# Patient Record
Sex: FEMALE | Race: WHITE | Employment: FULL TIME | URBAN - NONMETROPOLITAN AREA
[De-identification: names, ages, dates, MRNs, and addresses within clinical notes are randomized per-mention and may not be internally consistent; named-entity substitution may affect disease eponyms.]

---

## 2017-06-16 ENCOUNTER — TELEPHONE (OUTPATIENT)
Age: 52
End: 2017-06-16

## 2017-08-19 ENCOUNTER — HOSPITAL ENCOUNTER (OUTPATIENT)
Age: 52
Discharge: HOME | End: 2017-08-19
Payer: COMMERCIAL

## 2017-08-19 PROCEDURE — 93005 ELECTROCARDIOGRAM TRACING: CPT

## 2017-08-20 LAB
EKG ATRIAL RATE: 92 BPM
EKG P AXIS: 61 DEGREES
EKG P-R INTERVAL: 176 MS
EKG Q-T INTERVAL: 370 MS
EKG QRS DURATION: 96 MS
EKG QTC CALCULATION (BAZETT): 457 MS
EKG R AXIS: 20 DEGREES
EKG T AXIS: 37 DEGREES
EKG VENTRICULAR RATE: 92 BPM

## 2017-08-25 ENCOUNTER — HOSPITAL ENCOUNTER (OUTPATIENT)
Dept: NON INVASIVE DIAGNOSTICS | Age: 52
Discharge: HOME | End: 2017-08-25
Payer: COMMERCIAL

## 2017-08-25 LAB
LV EF: 69 %
LVEF MODALITY: NORMAL

## 2017-08-25 PROCEDURE — A9500 TC99M SESTAMIBI: HCPCS | Performed by: NUCLEAR MEDICINE

## 2017-08-25 PROCEDURE — 78452 HT MUSCLE IMAGE SPECT MULT: CPT

## 2017-08-25 PROCEDURE — 3430000000 HC RX DIAGNOSTIC RADIOPHARMACEUTICAL: Performed by: NUCLEAR MEDICINE

## 2017-08-25 PROCEDURE — 93017 CV STRESS TEST TRACING ONLY: CPT

## 2017-08-25 RX ADMIN — Medication 9.9 MILLICURIE: at 12:50

## 2017-08-25 RX ADMIN — Medication 32.6 MILLICURIE: at 14:20

## 2017-10-05 ENCOUNTER — HOSPITAL ENCOUNTER (OUTPATIENT)
Dept: MRI IMAGING | Age: 52
Discharge: HOME | End: 2017-10-05
Payer: COMMERCIAL

## 2017-10-05 DIAGNOSIS — M25.561 ACUTE PAIN OF RIGHT KNEE: ICD-10-CM

## 2017-10-05 PROCEDURE — 73721 MRI JNT OF LWR EXTRE W/O DYE: CPT

## 2020-01-24 ENCOUNTER — TELEPHONE (OUTPATIENT)
Dept: PULMONOLOGY | Age: 55
End: 2020-01-24

## 2020-01-24 NOTE — TELEPHONE ENCOUNTER
A prescription for new CPAP device was placed in Epic. Please fax the order to her DME. Keep follow up appointment with my clinic as scheduled with latest down load.

## 2020-01-24 NOTE — TELEPHONE ENCOUNTER
Patient called and stated that the humidifier part of her machine is broken. She is still using the machine but it makes her so dry and causes coughing that she cannot sleep. She was requesting a prescription for a new machine. She stated that the type she is using has been discontinued. She was last seen on 1/8/16 so an appointment was scheduled for 3/13/20 and was put on the wait list. Please advise if any other instructions.

## 2020-01-28 NOTE — TELEPHONE ENCOUNTER
Phoned patient to notify order was sent to [facility]. Left message to call back our office.

## 2020-02-03 ENCOUNTER — TELEPHONE (OUTPATIENT)
Dept: PULMONOLOGY | Age: 55
End: 2020-02-03

## 2020-02-03 NOTE — TELEPHONE ENCOUNTER
Per Rain Pt needs a F2F visit within 30 days for new CPAP order to be valid. I called today, but could not leave a message. LM on 1/27 to call back and Nilson Dobson called her on the 28th.

## 2020-02-16 NOTE — PROGRESS NOTES
smoker: Yes. Amount of current tobacco smokin.0 PPD    Sleep apnea symptoms:  She was diagnosed with Severe obstructive sleep apnea on treatment with a CPAP pressure of 14cm H20. She had problems with her old machine. Her heated humidifier was broken on her old machine. It was replaced with a new one. She is currently using a nasal mask. She requested a new machine and issued. History of headaches in the morning:No.  History of dry mouth in the morning: Yes. History of palpitations during night time/nocturnal awakenings: Some times. History of sweating during night time/nocturnal awakenings: Yes    General:  History of head injury in the past: No.   History of seizures: No.   Rest less legs syndrome symptoms:NO  History suggestive of periodic limb movements during sleep: NO  History suggestive of hypnagogic hallucinations: NO  History suggestive of hypnopompic hallucinations: NO  History suggestive of sleep talking:NO  History suggestive of sleep walking:NO  History suggestive of bruxism: No.  History suggestive of cataplexy: NO  History suggestive of sleep paralysis: NO    Family history of sleep disorders:  Family history of obstructive sleep apnea: NO.  Family history of Narcolepsy: NO.  Family history of Rest less legs syndrome : NO.    History regarding old sleep studies:  Prior history of sleep study: Yes. Using CPAP device:Yes  Currently using home Oxygen: NO.      Patient considerations:  Is the patient is ambulatory: Yes  Patient is currently using: None of these Wheelchair, Susan Ion or [employer].   Para/Quadriplegic: NO  Hearing deficit : NO  Claustrophobic: NO  MDD : NO  Blind: NO  Incontinent: NO  Para/Quadraplegi: NO.   Need transportation to and from Sleep Center:NO      Social History:  Social History     Tobacco Use    Smoking status: Current Every Day Smoker     Packs/day: 1.00     Types: Cigarettes     Start date: 3/2/2001    Smokeless tobacco: Never Used   Substance Use Topics    Alcohol use: Yes     Alcohol/week: 0.0 standard drinks     Comment: very rare    Drug use: No   .  She is currently working: Yes. She is currently working for [employer]. She usually goes to her work at:  7:30AM.   She completes her work at:  2:00PM.                          Past Medical History:   Diagnosis Date    Tachycardia        Past Surgical History:   Procedure Laterality Date     SECTION         No Known Allergies    Current Outpatient Medications   Medication Sig Dispense Refill    Insulin NPH Human, Isophane, (HUMULIN N SC) Inject into the skin      buPROPion (WELLBUTRIN XL) 300 MG extended release tablet Take 300 mg by mouth every morning      glipiZIDE (GLUCOTROL) 5 MG tablet Take 5 mg by mouth 2 times daily (before meals)      atenolol (TENORMIN) 50 MG tablet Take 1.5 tablets by mouth daily 45 tablet 0    meloxicam (MOBIC) 15 MG TABS Take 15 mg by mouth Plan to take short term      Probiotic Product (PROBIOTIC DAILY PO) Take by mouth 10 strains, 30 billion AC      albuterol (PROVENTIL) (2.5 MG/3ML) 0.083% nebulizer solution Take 2.5 mg by nebulization every 6 hours as needed for Wheezing 8.5 gm. PRN      cetirizine (ZYRTEC) 10 MG tablet Take 10 mg by mouth daily.  sitaGLIPtin (JANUVIA) 100 MG tablet Take 100 mg by mouth daily.  metFORMIN (GLUCOPHAGE) 1000 MG tablet Take 1,000 mg by mouth 2 times daily (with meals).  atorvastatin (LIPITOR) 10 MG tablet Take 10 mg by mouth daily.  Loratadine-Pseudoephedrine (CLARITIN-D 12 HOUR PO) Take  by mouth.  DULoxetine (CYMBALTA) 60 MG capsule Take 60 mg by mouth daily.  budesonide-formoterol (SYMBICORT) 80-4.5 MCG/ACT AERO Inhale 2 puffs into the lungs 2 times daily. No current facility-administered medications for this visit. No family history on file. Review of Systems:   General/Constitutional: She gained 28lbs of weight from her old sleep study with normal appetite.  No fever or affect. Patient behavior is normal.     Diagnostic Data:  None related sleep. Sleep study: 07/21/2009                                CPAP study: 7/28/2009                Diagnostic Data: Psg 7/21/07  Ahi 131.6  PAP Download:   Recorded compliance dates:1/20/20-2/27/20  More than 4hour usage compliance was 66.7:%.(She start using her CPAP machine on 2/7/20. The download was calculated from 1/29/20). Average residual Apnea- Hypoapnea index on current pressue was:1.0.       PAP Type cpap   Level  14      Average usuage hours per day was:.[id number]         Interface: nasal     Provider:  []?[facility]             []?[facility]                        [x]? [facility]          []? [facility]                           []?[facility]      []? Other:         Assessment:  -She was diagnosed with Severe obstructive sleep apnea on treatment with a CPAP pressure of 14cm H20. She had problems with her old machine. Her machine is an old, obsolete one. It was initially issued in 2009. Her heated humidifier on old machine is broken. It was replaced with a new machine. She is currently using a nasal mask. She is using her machine with good compliance. How ever she start using her CPAP machine on 2/7/20. The download was calculated from 1/29/20.  -Bronchial asthma- on treatment with Adviar and Albuterol- She follows with her family physician. -Depression on meds.  -Allergic rhinitis on treatment with Claritin 10mg PO at bed time.  -Hypersomnia ( Excessive daytime sleepiness) may be due to poor compliance to recommended CPAP therapy for her obstructive sleep apnea Vs Inadequate sleep hygiene. Recommendations/Plan:  -She was advised to submit her down load report from [facility] for next 1month starting from today to document her > 4hour compliance. She was informed about the possibility of loosing her CPAP if she don't use it with good compliance for >4hours i.e >70%.  She verbalizes understanding.  -She was advised to continue current

## 2020-03-02 ENCOUNTER — INITIAL CONSULT (OUTPATIENT)
Dept: PULMONOLOGY | Age: 55
End: 2020-03-02
Payer: COMMERCIAL

## 2020-03-02 VITALS
SYSTOLIC BLOOD PRESSURE: 118 MMHG | HEART RATE: 86 BPM | DIASTOLIC BLOOD PRESSURE: 78 MMHG | WEIGHT: 273.6 LBS | OXYGEN SATURATION: 96 % | BODY MASS INDEX: 46.71 KG/M2 | HEIGHT: 64 IN

## 2020-03-02 PROCEDURE — 99204 OFFICE O/P NEW MOD 45 MIN: CPT | Performed by: INTERNAL MEDICINE

## 2020-03-02 RX ORDER — BUPROPION HYDROCHLORIDE 300 MG/1
300 TABLET ORAL EVERY MORNING
COMMUNITY

## 2020-03-02 NOTE — PATIENT INSTRUCTIONS
Patient Education        Stopping Smoking: Care Instructions  Your Care Instructions  Cigarette smokers crave the nicotine in cigarettes. Giving it up is much harder than simply changing a habit. Your body has to stop craving the nicotine. It is hard to quit, but you can do it. There are many tools that people use to quit smoking. You may find that combining tools works best for you. There are several steps to quitting. First you get ready to quit. Then you get support to help you. After that, you learn new skills and behaviors to become a nonsmoker. For many people, a necessary step is getting and using medicine. Your doctor will help you set up the plan that best meets your needs. You may want to attend a smoking cessation program to help you quit smoking. When you choose a program, look for one that has proven success. Ask your doctor for ideas. You will greatly increase your chances of success if you take medicine as well as get counseling or join a cessation program.  Some of the changes you feel when you first quit tobacco are uncomfortable. Your body will miss the nicotine at first, and you may feel short-tempered and grumpy. You may have trouble sleeping or concentrating. Medicine can help you deal with these symptoms. You may struggle with changing your smoking habits and rituals. The last step is the tricky one: Be prepared for the smoking urge to continue for a time. This is a lot to deal with, but keep at it. You will feel better. Follow-up care is a key part of your treatment and safety. Be sure to make and go to all appointments, and call your doctor if you are having problems. It's also a good idea to know your test results and keep a list of the medicines you take. How can you care for yourself at home? · Ask your family, friends, and coworkers for support. You have a better chance of quitting if you have help and support.   · Join a support group, such as Nicotine Anonymous, for people who are trying to quit smoking. · Consider signing up for a smoking cessation program, such as the American Lung Association's Freedom from Smoking program.  · Get text messaging support. Go to the website at www.smokefree. gov to sign up for the [facility] program.  · Set a quit date. Pick your date carefully so that it is not right in the middle of a big deadline or stressful time. Once you quit, do not even take a puff. Get rid of all ashtrays and lighters after your last cigarette. Clean your house and your clothes so that they do not smell of smoke. · Learn how to be a nonsmoker. Think about ways you can avoid those things that make you reach for a cigarette. ? Avoid situations that put you at greatest risk for smoking. For some people, it is hard to have a drink with friends without smoking. For others, they might skip a coffee break with coworkers who smoke. ? Change your daily routine. Take a different route to work or eat a meal in a different place. · Cut down on stress. Calm yourself or release tension by doing an activity you enjoy, such as reading a book, taking a hot bath, or gardening. · Talk to your doctor or pharmacist about nicotine replacement therapy, which replaces the nicotine in your body. You still get nicotine but you do not use tobacco. Nicotine replacement products help you slowly reduce the amount of nicotine you need. These products come in several forms, many of them available over-the-counter:  ? Nicotine patches  ? Nicotine gum and lozenges  ? Nicotine inhaler  · Ask your doctor about bupropion (Wellbutrin) or varenicline (Chantix), which are prescription medicines. They do not contain nicotine. They help you by reducing withdrawal symptoms, such as stress and anxiety. · Some people find hypnosis, acupuncture, and massage helpful for ending the smoking habit. · Eat a healthy diet and get regular exercise.  Having healthy habits will help your body move past its craving for nicotine. · Be prepared to keep trying. Most people are not successful the first few times they try to quit. Do not get mad at yourself if you smoke again. Make a list of things you learned and think about when you want to try again, such as next week, next month, or next year. Where can you learn more? Go to https://[employer] Connectionspepiceweb.[employer]. org and sign in to your [employer] account. Enter C478 in the Vericept box to learn more about \"Stopping Smoking: Care Instructions. \"     If you do not have an account, please click on the \"Sign Up Now\" link. Current as of: July 4, 2019  Content Version: 12.3  © 2075-9376 [employer], [employer]. Care instructions adapted under license by [facility] If you have questions about a medical condition or this instruction, always ask your healthcare professional. [address] any warranty or liability for your use of this information. Recommendations/Plan:  -She was advised to submit [facility] down [facility] for next 1month starting from today to document her > 4hour compliance. She was informed about the possibility of loosing her CPAP if she don't use it with good compliance for >4hours i.e >70%. She verbalizes understanding.  -She was advised to continue current positive airway pressure therapy with above described pressure.  -She advised to keep good compliance with current recommended pressure to get optimal results and clinical improvement.  -She was educated to practice good sleep hygiene practices. She was provided with a good sleep hygiene hand out.  -Ines Anton was advised to make earlier appointment with my clinic if she develops any worsening of sleep symptoms. She verbalizes understanding.  -Karyna was advised to not to drive any motor vehicles or operate heavy equipment until her sleep symptoms are under good control. Karyna Adams verbalizes understanding.  -She was advised to loose weight by controlling diet and doing exercise once cleared by her family physician.  -Follow with my clinic in 6months for clinical reevaluation with review of download. -She was advised to call [facility] regarding supplies if needed. - Patient educated to quit smoking as soon as possible. Patient verbalizes understanding of adverse consequences of tobacco smoking (3 minutes). -She is aware of the consequences of poor compliance to his recommended positive air way pressure therapy including increased risk for cardiovascular and cerebrovascular events and morbidity including death.  -She was advised call my office for earlier appointment if needed for worsening of sleep symptoms.  - Ryan Manzano was educated about my impression and plan. She verbalizes understanding.

## 2024-08-02 ENCOUNTER — HOSPITAL ENCOUNTER (OUTPATIENT)
Dept: GENERAL RADIOLOGY | Age: 59
Discharge: HOME | End: 2024-08-02
Attending: ORTHOPAEDIC SURGERY
Payer: COMMERCIAL

## 2024-08-02 ENCOUNTER — HOSPITAL ENCOUNTER (OUTPATIENT)
Age: 59
Discharge: HOME | End: 2024-08-02
Payer: COMMERCIAL

## 2024-08-02 DIAGNOSIS — M25.561 RIGHT KNEE PAIN, UNSPECIFIED CHRONICITY: ICD-10-CM

## 2024-08-02 PROCEDURE — 73564 X-RAY EXAM KNEE 4 OR MORE: CPT
